# Patient Record
(demographics unavailable — no encounter records)

---

## 2024-11-08 NOTE — HISTORY OF PRESENT ILLNESS
[FreeTextEntry1] : Pleasant 68-year-old female who returns with a chief complaint of recurrent prolapsing hemorrhoids.  I have treated this patient in the past for symptomatic hemorrhoids.  I actually performed an excisional hemorrhoidectomy on her approximately 4 years ago.  Rein the recent past she had recurrent prolapse and I once again performed a series of hemorrhoidal banding's.  She did well for short while but once again is experiencing prolapse.  She states that she does have difficulty evacuating and strain somewhat despite the use of Citrucel and MiraLAX.  Patient was examined in the left lateral decubitus position.  An anoscope was introduced and initially a right anterior hemorrhoidal band was placed.  Subsequent to this a right posterior band was placed.  I will see her as needed.

## 2025-01-29 NOTE — ASSESSMENT
[FreeTextEntry1] : Patient was not seen she left the office because people were coughing in the waiting room

## 2025-04-02 NOTE — HISTORY OF PRESENT ILLNESS
[FreeTextEntry1] : 68-year-old female who is well-known to me.  I had performed an excisional hemorrhoidectomy on her years ago.  She developed recurrent hemorrhoids and I have been repeatedly banding her.  She was last banded in November and now states that she has recurrent prolapsing tissue.  The patient was placed on the commode and Valsalva maneuver initiated.  She does have recurrent prolapsing hemorrhoids.  An anoscope was introduced and multiple attempts were made at banding.  The bands just continue to slip off.  Whether this is due to repeat banding's or fibrosis from her hemorrhoidectomy is unclear to me.  Regardless, I will reattempt the banding in 2 weeks

## 2025-04-30 NOTE — HISTORY OF PRESENT ILLNESS
[FreeTextEntry1] : 68-year-old female who presents with a chief complaint of ongoing prolapsing internal hemorrhoids.  I have seen her in the past for the same complaint.  Patient is examined in the left lateral decubitus position.  An anoscope was introduced and the right anterior hemorrhoid was isolated and a band deployed.  Subsequently the anoscope was repositioned to reveal the right posterior hemorrhoid.  An additional band was placed.  I advised the patient to give this 4 to 6 weeks.  I will see her if symptoms persist.

## 2025-05-12 NOTE — REASON FOR VISIT
[Follow-Up Visit] : a follow-up visit for [Other: _____] : [unfilled] [FreeTextEntry2] : Annual breast examination, Maddy with score = 23.8%.

## 2025-05-12 NOTE — REVIEW OF SYSTEMS
[Negative] : Heme/Lymph [FreeTextEntry5] : Hypertension [FreeTextEntry7] : Penicillin [de-identified] : History of squamous cell cancer

## 2025-05-12 NOTE — HISTORY OF PRESENT ILLNESS
[de-identified] : 68-year-old lady.  Tyrer-Cuzick risk score = 23.8.  Annual breast examination.  2023: Baseline breast examination. At that time, she was asymptomatic. My physical exam is unremarkable.  She returns for scheduled follow-up today.   CC: Today she is asymptomatic.   2023: Referred by her gynecologist (Dr. Luann RODRIGUEZ) for baseline breast examination.   + Prior personal history: 2023: Right breast (retroareolar) sonogram guided core needle biopsy at [facility]: "X shaped" biopsy clip. Duct ectasia with intramural calcifications and inspissated luminal secretions. These benign findings are concordant. Pathology from [place]. This was an asymptomatic nonpalpable 1 x 0.5 x 1 cm intraductal mass with associated calcifications.  No other procedures related to either breast.   + Prior personal history of malignancy: : +SCCA Right thigh skin cancer (squamous cell carcinoma) excised by dermatology (Dr. Parth THORNTON).  No other personal history of malignancy   + FH: Mother: Breast cancer at age 50. Her mother also had myeloma. Her mother did NOT have genetic testing.  2023: Our patient had genetic testing through us. [employer] (84 gene assay): NO deleterious mutations.  No relatives with ovarian cancer.  + Ashkenazi.  Other family history of malignancy: Mother: Myeloma (in addition to her breast cancer (above)). Father had lung cancer and lymphoma.   Menarche at 13.  1, para 1 at 25. Natural menopause at 59.   PMD: Dr. Anamaria NOLASCO. Her previous internist (Dominick Salomon) retired at the end of .  + ALLERGIC: Penicillin.  No pacemaker or defibrillator. No anticoagulants.  + Hypertension. Treated with lisinopril and amlodipine.  + Hypercholesterolemia. She takes daily atorvastatin.  Cardiology: Dr. Mega TURK. Before that she was seeing Dr. Sunil Mota.   + Dyspnea on exertion. Seen by pulmonary medicine: Dr. Hubert AMOR. No abnormality identified on his evaluation. Given albuterol inhaler to be used as needed.  Spring 2025: Diagnosed with a cataract of her right eye. She will have surgery in the 2025. Ophthalmology: Dr. Gurinder CARL.   GYN: Dr. Luann RODRIGUEZ. 2023 visit was unremarkable. I suggested a follow-up visit.   Colonoscopy: Dr. Diana FRANCO. 2021: [place] x5 years (2026............ )

## 2025-05-13 NOTE — ASSESSMENT
[FreeTextEntry1] : Prior note nurse practitioner Wen September 19, 2024::  This is a 68-year-old female with past medical history significant for hypertension, and hyperlipidemia, who comes in for cardiac follow-up evaluation.  HPI: She is feeling generally well today and denies chest pain, dizziness, heart palpitations, recent episodes of syncope or falls, SOB, or dyspnea at this time.  Current Medications: Telmisartan 80 mg daily, Labetalol 100 mg BID, and Atorvastatin 10 mg daily.   She is here for a BP check after beginning Labetalol 100 mg BID. I have recommended she be evaluated for obstructive sleep apnea due to her consistent hypertension while on multimodal medication. She will take this under consideration.   Depression screening completed. Discussed negative results with patient during visit. Patient will follow-up with their primary care provider for mental health management if needed in the future.  BLOOD PRESSURE: Elevated in today's visit.  *Recommended increasing to Labetalol 200 mg BID for better BP control. Patient agreeable to plan.  *Suggested adding Hydrochlorothiazide 25 mg daily but states she would prefer to not be on a diuretic  *Suggested adding Amlodipine 5 mg daily in the evening but states she remembers being on this in the past without improvement  BLOOD WORK: -New blood work was done 09/19/2024 to evaluate lipid profile, CBC, BMP, hepatic function, A1C and TSH.   TESTING/REPORTS: -EKG done 09/19/2024 demonstrated sinus bradycardia rate 57 bpm otherwise unremarkable.  -Electrocardiogram done February 29, 2024 demonstrates sinus bradycardia rate of 52 bpm is otherwise remarkable for left atrial abnormality    PLAN: -She will increase to Labetalol 200 mg BID for better BP control.  -She will continue her other current medications and contact the office if problems arise before next follow-up appointment. -She will have her cardiology records faxed over for our records.    I have discussed the plan of care with JUDITH CLINTON and she will follow up in 1-2 months. They are compliant with all of their medications.     The patient understands that aerobic exercises must be increased to 40 minutes 4 times/week and a detailed discussion of lifestyle modification was done today.   The patient has a good understanding of the diagnosis, treatment plan and lifestyle modification.   They will contact me at the office for any questions with their care or any changes in their health status.   The patient was discussed with supervision physician Dr. Mega Reynoso at the time of the visit and the plan of care will be carried out as noted above.     JA Carver NP

## 2025-05-13 NOTE — DISCUSSION/SUMMARY
[FreeTextEntry1] : This is a 68-year-old female with past medical history significant for hypertension, and hyperlipidemia, who comes in for cardiac follow-up evaluation. She denies chest pain, shortness of breath, dizziness or syncope.  She does complain of occasional palpitations or fluttering like sensation every few months unrelated to any particular activity.  She has no history of rheumatic fever.  She does not drink excessive caffeine or alcohol. Cardiac risk factors include hypertension hyperlipidemia.  (Mother had hyperlipidemia). She reports that her blood pressure is variable sometimes within normal limits sometimes slightly elevated (similar to today).  She is working on her diet and trying to limit her salt intake and increase her aerobic activity. Lipid panel done April 16, 2025 demonstrated a cholesterol of 229, HDL 64, triglycerides under 56, LDL calculated 137, non-HDL cholesterol 165 mg/dL and hemoglobin A1c of 5.5. I would recommend she increase her atorvastatin to 20 mg daily to try to lower her LDL cholesterol to less than 100 mg/dL.  She will then have follow-up blood work done in 2 months. Electrocardiogram done February 29, 2024 demonstrates sinus bradycardia rate of 52 bpm is otherwise remarkable for left atrial abnormality She started out on lisinopril 5 mg daily which did not control her blood pressure.  She was placed on valsartan which was too expensive and did develop dizziness while she was briefly on the therapy.  She was then placed on losartan 50 mg daily which was increased to 50 mg twice per day in addition to Toprol-XL 50 mg in the evening. She does give a history of snoring and going to the bathroom 2-3 times an evening and some daytime fatigue. I have recommended she schedule a sleep study to rule out sleep apnea as a contributing factor to her hypertension. At this point in time, losartan is not controlling her blood pressure. She will start telmisartan 80 mg daily in the morning and discontinue her losartan.  I have also asked her to lower her dose of metoprolol succinate to 25 mg after dinner.  She will follow-up with me in 1 month to recheck her blood pressure.  She understands she must limit her salt intake.  At the time of next visit, I will discuss the role of our registered dietitian and assisting her to limit her caloric intake as well as her salt intake and to improve her lipid profile. She will get me a copy of her most recent blood work and lipid profile. She reports that she had a normal stress test and echocardiogram done at [facility] (as an outpatient), and have asked her to get me copies of those results for my review. She is on Lipitor 10 mg daily and has an LDL target of less than 100 mg/dL.  The patient is instructed to follow-up with her primary care physician. She will follow-up with me as noted above.  The patient understands that aerobic exercises must be increased to 40 minutes 4 times per week. A detailed discussion of lifestyle modification was done today. The patient has a good understanding of the diagnosis, and treatment plan. Lifestyle modification was also outlined.  Thank you for allowing to participate in care of your patient.  Please do not hesitate to call if you have any questions.

## 2025-05-13 NOTE — REASON FOR VISIT
[CV Risk Factors and Non-Cardiac Disease] : CV risk factors and non-cardiac disease [Hyperlipidemia] : hyperlipidemia [Hypertension] : hypertension [FreeTextEntry3] : Dr. Salomon [FreeTextEntry1] : 68-year-old female presenting for cardiac consultation.  Past medical history significant for hypertension and hyperlipidemia. Patient is currently prescribed metoprolol succinate ER 50mg daily and losartan 50mg twice a day.  Cardiac risk factors include hypertension, hyperlipidemia, and family history of hyperlipidemia (mother).   Patient states she feels generally well today and denies recent episodes of chest pain, palpitations, shortness of breath, dizziness, or syncope.  Patient states she was referred for management of her high blood pressure which she states is usually around 155/90. Patient states she has been on Metoprolol 50mg, and she was previously on Lisinopril 5mg which was discontinued and replaced by Losartan 50mg twice a day. Patient also mentioned she feels an occasional fluttering feeling in her chest about every 6 months or so.   Social history: Denies smoking or alcohol consumption.  Surgical history significant for excision of squamous cell carcinoma of the skin 1-2 years ago (around 2022), and hemorrhoidectomy 2 years ago (around 2022).